# Patient Record
Sex: MALE | Race: WHITE | NOT HISPANIC OR LATINO | Employment: UNEMPLOYED | URBAN - METROPOLITAN AREA
[De-identification: names, ages, dates, MRNs, and addresses within clinical notes are randomized per-mention and may not be internally consistent; named-entity substitution may affect disease eponyms.]

---

## 2017-01-20 ENCOUNTER — APPOINTMENT (OUTPATIENT)
Dept: CT IMAGING | Facility: HOSPITAL | Age: 27
End: 2017-01-20
Attending: NEUROLOGICAL SURGERY

## 2017-01-30 ENCOUNTER — APPOINTMENT (OUTPATIENT)
Dept: CT IMAGING | Facility: HOSPITAL | Age: 27
End: 2017-01-30
Attending: NEUROLOGICAL SURGERY

## 2017-01-30 ENCOUNTER — APPOINTMENT (OUTPATIENT)
Dept: CT IMAGING | Facility: HOSPITAL | Age: 27
End: 2017-01-30

## 2017-02-07 ENCOUNTER — OFFICE VISIT (OUTPATIENT)
Dept: PSYCHIATRY | Facility: CLINIC | Age: 27
End: 2017-02-07

## 2017-02-07 VITALS
BODY MASS INDEX: 28.29 KG/M2 | HEART RATE: 86 BPM | SYSTOLIC BLOOD PRESSURE: 126 MMHG | WEIGHT: 191 LBS | HEIGHT: 69 IN | DIASTOLIC BLOOD PRESSURE: 77 MMHG

## 2017-02-07 DIAGNOSIS — F25.0 SCHIZOAFFECTIVE DISORDER, BIPOLAR TYPE (HCC): Primary | ICD-10-CM

## 2017-02-07 PROCEDURE — 99214 OFFICE O/P EST MOD 30 MIN: CPT | Performed by: NURSE PRACTITIONER

## 2017-02-07 RX ORDER — RISPERIDONE 0.5 MG/1
0.5 TABLET ORAL 2 TIMES DAILY
Qty: 60 TABLET | Refills: 0 | Status: SHIPPED | OUTPATIENT
Start: 2017-02-07 | End: 2017-04-24 | Stop reason: SDUPTHER

## 2017-02-07 RX ORDER — GUANFACINE 1 MG/1
0.5 TABLET ORAL 2 TIMES DAILY
Qty: 30 TABLET | Refills: 0 | Status: SHIPPED | OUTPATIENT
Start: 2017-02-07 | End: 2017-04-24 | Stop reason: SDUPTHER

## 2017-02-07 RX ORDER — DIVALPROEX SODIUM 500 MG/1
500 TABLET, DELAYED RELEASE ORAL NIGHTLY
Qty: 90 TABLET | Refills: 2 | Status: SHIPPED | OUTPATIENT
Start: 2017-02-07 | End: 2017-04-24 | Stop reason: SDUPTHER

## 2017-02-07 RX ORDER — PALIPERIDONE 3 MG/1
3 TABLET, EXTENDED RELEASE ORAL EVERY MORNING
Qty: 30 TABLET | Refills: 0 | Status: SHIPPED | OUTPATIENT
Start: 2017-02-07 | End: 2017-02-07

## 2017-02-07 NOTE — PROGRESS NOTES
Subjective   [place] Jin Brewster is a 26 y.o. male who is here today for medication management follow up at the [facility], he presents to his appointment about 10 minutes late.     Chief Complaint:  Follow-up    History of Present Illness   He states that he is not having any positive effects from current medications- he is no longer taking the medications that were prescribed.   He states that his mood and depression seems to be triggered by little things, he has a lot of anxiety.  He is planning on starting the suboxone clinic tomorrow, shares that he continues to use pills off the street.  He states that he is no longer living with his parents, homeless currently.  He did get a new car and started a relationship.  He feels like his anxiety gets so bad that he can't spend time with his son, he doesn't have the energy to interact.  He has been having issues with more anger lately.  He states that his sleep has been disrupted.  Denies any health issues, needing another CT scan on his back.  He has been having hallucinations, seeing things at night before bed.  Denies any SI/HI.      The following portions of the patient's history were reviewed and updated as appropriate: allergies, current medications, past family history, past medical history, past social history, past surgical history and problem list.    Review of Systems   Constitutional: Negative for appetite change, chills, diaphoresis, fatigue, fever and unexpected weight change.   HENT: Negative for hearing loss, sore throat, trouble swallowing and voice change.    Eyes: Negative for photophobia and visual disturbance.   Respiratory: Negative for cough, chest tightness and shortness of breath.    Cardiovascular: Negative for chest pain and palpitations.   Gastrointestinal: Negative for abdominal pain, constipation, nausea and vomiting.   Endocrine: Negative for cold intolerance and heat intolerance.   Genitourinary: Negative for dysuria and frequency.  "  Musculoskeletal: Positive for back pain. Negative for arthralgias, joint swelling and neck stiffness.   Skin: Negative for color change and wound.   Allergic/Immunologic: Negative for environmental allergies and immunocompromised state.   Neurological: Negative for dizziness, tremors, seizures, syncope, weakness, light-headedness and headaches.   Hematological: Negative for adenopathy. Does not bruise/bleed easily.       Objective   Physical Exam   Constitutional: He appears well-developed and well-nourished. No distress.   Neurological: He is alert. Coordination and gait normal.   Vitals reviewed.    Blood pressure 126/77, pulse 86, height 69\" (175.3 cm), weight 191 lb (86.6 kg).    No Known Allergies    Current Medications:   Current Outpatient Prescriptions   Medication Sig Dispense Refill   • gabapentin (NEURONTIN) 800 MG tablet Take 1 tablet by mouth 4 (Four) Times a Day. 120 tablet 3   • divalproex (DEPAKOTE) 500 MG DR tablet Take 1 tablet by mouth Every Night. 90 tablet 2   • guanFACINE (TENEX) 1 MG tablet Take 0.5 tablets by mouth 2 (Two) Times a Day. 30 tablet 0   • paliperidone (INVEGA) 3 MG 24 hr tablet Take 1 tablet by mouth Every Morning. 30 tablet 0     No current facility-administered medications for this visit.        Mental Status Exam:   Hygiene:   fair  Cooperation:  Cooperative  Eye Contact:  Fair  Psychomotor Behavior:  Appropriate  Affect:  Appropriate  Hopelessness: 4  Speech:  Normal  Thought Process:  Goal directed and Linear  Thought Content:  Normal  Suicidal:  None  Homicidal:  None  Hallucinations:  None  Delusion:  None  Memory:  Intact  Orientation:  Person, Place, Time and Situation  Reliability:  fair  Insight:  Fair  Judgement:  Fair  Impulse Control:  Fair  Physical/Medical Issues:  Yes back issues    Assessment/Plan     Long term use of drug  -     [facility] Drug Screen    Schizoaffective disorder, Bipolar Type    Post traumatic stress disorder (PTSD)    Other orders    Depakote " 500mg qhs  tenex 1mg 1/2 tab bid  invega 3mg daily          Discused medications options.  Discontinue propranolol, abilify, and effexor- stopped taking on his own.  Begin depakote for mood, tenex for anxiety and invega for hallucinations.  Reviewed  [employer] testing.  Jury Duty form completed.  Discussed the risks, beneefits, and side effects of the medications; patient ackowledged and verbally consentedd.  Patient is aware to call the [facility] with any worsening of symptoms.  Patient is agreeable to call 911 or go to the nearest ER should he/she begin having SI/HI.    Return in 4 weeks       -- insurance will not pay for invega.  Will switch to risperdal 0.5mg bid

## 2017-02-15 ENCOUNTER — APPOINTMENT (OUTPATIENT)
Dept: CT IMAGING | Facility: HOSPITAL | Age: 27
End: 2017-02-15

## 2017-02-15 ENCOUNTER — APPOINTMENT (OUTPATIENT)
Dept: CT IMAGING | Facility: HOSPITAL | Age: 27
End: 2017-02-15
Attending: NEUROLOGICAL SURGERY

## 2017-02-24 ENCOUNTER — APPOINTMENT (OUTPATIENT)
Dept: CT IMAGING | Facility: HOSPITAL | Age: 27
End: 2017-02-24
Attending: NEUROLOGICAL SURGERY

## 2017-02-24 ENCOUNTER — APPOINTMENT (OUTPATIENT)
Dept: CT IMAGING | Facility: HOSPITAL | Age: 27
End: 2017-02-24

## 2017-03-07 ENCOUNTER — OFFICE VISIT (OUTPATIENT)
Dept: PSYCHIATRY | Facility: CLINIC | Age: 27
End: 2017-03-07

## 2017-03-07 VITALS
HEIGHT: 69 IN | BODY MASS INDEX: 30.51 KG/M2 | SYSTOLIC BLOOD PRESSURE: 129 MMHG | HEART RATE: 93 BPM | WEIGHT: 206 LBS | DIASTOLIC BLOOD PRESSURE: 84 MMHG

## 2017-03-07 DIAGNOSIS — F43.10 POST TRAUMATIC STRESS DISORDER (PTSD): ICD-10-CM

## 2017-03-07 DIAGNOSIS — F25.0 SCHIZOAFFECTIVE DISORDER, BIPOLAR TYPE (HCC): Primary | ICD-10-CM

## 2017-03-07 PROCEDURE — 99213 OFFICE O/P EST LOW 20 MIN: CPT | Performed by: NURSE PRACTITIONER

## 2017-03-07 NOTE — PROGRESS NOTES
Subjective   [place] Jin Brewster is a 26 y.o. male who is here today for medication management follow up at the [facility], he presents to his appointment about 30 minutes early    Chief Complaint:  Follow-up    History of Present Illness   Patient presented to his appointment with his aunt whom he calls Mom.  He is noted to be over-sedating with dificulty walking to the office.  He sits in the seat but is not able to respond to questions, after several attempts per his mother and this provider he is alert enough to state that he has had these episodes a couple of time and doesn't know what causes them.  He shares that he feels overly tired, reports that it started on his way over to his appointment.  He shares that he took his gabapentin before he came to his appointment.  He feels like it is seizure activity but is noted to be on 2 different medications for treatment.  Recommended that he go to the ER for evaluation but patient adamantly reports that it is because he is tired and refuses to go to ER.  He denies any symptoms of SI/HI just noted to be over-sedated.  He is agreeable to return next week for medication management because he doesn't feel like the medications are helpful in treating his schizophrenia.  Will hold RX until next appointment.  Again, recommended ER but patient refused.  Informed mother to bring him is he has any worsening of symptoms, patient and mother is agreeable.       The following portions of the patient's history were reviewed and updated as appropriate: allergies, current medications, past family history, past medical history, past social history, past surgical history and problem list.    Review of Systems   Constitutional: Positive for fatigue. Negative for appetite change, chills, diaphoresis, fever and unexpected weight change.   HENT: Negative for hearing loss, sore throat, trouble swallowing and voice change.    Eyes: Negative for photophobia and visual disturbance.  "  Respiratory: Negative for cough, chest tightness and shortness of breath.    Cardiovascular: Negative for chest pain and palpitations.   Gastrointestinal: Negative for abdominal pain, constipation, nausea and vomiting.   Endocrine: Negative for cold intolerance and heat intolerance.   Genitourinary: Negative for dysuria and frequency.   Musculoskeletal: Positive for back pain. Negative for arthralgias, joint swelling and neck stiffness.   Skin: Negative for color change and wound.   Allergic/Immunologic: Negative for environmental allergies and immunocompromised state.   Neurological: Negative for dizziness, tremors, seizures, syncope, weakness, light-headedness and headaches.   Hematological: Negative for adenopathy. Does not bruise/bleed easily.   Psychiatric/Behavioral:        Difficulty responding to questions, required multiple attempts.        Objective   Physical Exam   Constitutional: He appears well-developed. No distress.   Neurological: Coordination abnormal. Gait normal.   Required assistance for mobilization   Vitals reviewed.    Blood pressure 129/84, pulse 93, height 69\" (175.3 cm), weight 206 lb (93.4 kg).    No Known Allergies    Current Medications:   Current Outpatient Prescriptions   Medication Sig Dispense Refill   • divalproex (DEPAKOTE) 500 MG DR tablet Take 1 tablet by mouth Every Night. 90 tablet 2   • gabapentin (NEURONTIN) 800 MG tablet Take 1 tablet by mouth 4 (Four) Times a Day. 120 tablet 3   • guanFACINE (TENEX) 1 MG tablet Take 0.5 tablets by mouth 2 (Two) Times a Day. 30 tablet 0   • risperiDONE (RISPERDAL) 0.5 MG tablet Take 1 tablet by mouth 2 (Two) Times a Day. 60 tablet 0     No current facility-administered medications for this visit.        Mental Status Exam:   Hygiene:   fair  Cooperation:  uncoooperative  Eye Contact:  Closed  Psychomotor Behavior:  Slow  Affect:  Blunted  Hopelessness: Denies  Speech:  slurred  Thought Process:  Unable to demonstrate  Thought Content:  " limited  Suicidal:  None  Homicidal:  None  Hallucinations:  None  Delusion:  None  Memory:  Intact  Orientation:  Person, Place, Time and Situation  Reliability:  fair  Insight:  Fair  Judgement:  Fair  Impulse Control:  Fair  Physical/Medical Issues:  Yes back issues    Assessment/Plan     Long term use of drug  -     [facility] Drug Screen    Schizoaffective disorder, Bipolar Type    Post traumatic stress disorder (PTSD)    Other orders    Hold current medications.           Discused medications options.  Hold depakote for mood, tenex for anxiety and risperdal  for hallucinations.  Reviewed  [employer] testing.   Discussed the risks, beneefits, and side effects of the medications; patient ackowledged and verbally consentedd.  Patient is aware to call the [facility] with any worsening of symptoms.  Patient is agreeable to call 911 or go to the nearest ER should he/she begin having SI/HI.    Return in 1 weeks

## 2017-03-20 DIAGNOSIS — M40.295 OTHER KYPHOSIS OF THORACOLUMBAR REGION: ICD-10-CM

## 2017-03-20 DIAGNOSIS — G57.10 MERALGIA PARESTHETICA, UNSPECIFIED LATERALITY: ICD-10-CM

## 2017-03-20 RX ORDER — GABAPENTIN 800 MG/1
800 TABLET ORAL 4 TIMES DAILY
Qty: 120 TABLET | Refills: 0 | Status: SHIPPED | OUTPATIENT
Start: 2017-03-20 | End: 2017-04-24 | Stop reason: SDUPTHER

## 2017-03-20 NOTE — TELEPHONE ENCOUNTER
Provider:  Anjel  Caller:   [place]  Time of call:     Phone #:  [phone]  Surgery:  n/a  Surgery Date:  n/a  Last visit:   12/7/16    ANATOLIY:         Reason for call:       Gabapentin refill

## 2017-03-20 NOTE — TELEPHONE ENCOUNTER
We will refill one time. Pt has not been seen since December and has no showed several times. No further refills until he is seen

## 2017-04-24 DIAGNOSIS — M40.295 OTHER KYPHOSIS OF THORACOLUMBAR REGION: ICD-10-CM

## 2017-04-24 DIAGNOSIS — G57.10 MERALGIA PARESTHETICA, UNSPECIFIED LATERALITY: ICD-10-CM

## 2017-04-24 RX ORDER — DIVALPROEX SODIUM 500 MG/1
500 TABLET, DELAYED RELEASE ORAL NIGHTLY
Qty: 90 TABLET | Refills: 2 | Status: SHIPPED | OUTPATIENT
Start: 2017-04-24 | End: 2018-04-24

## 2017-04-24 RX ORDER — RISPERIDONE 0.5 MG/1
0.5 TABLET ORAL 2 TIMES DAILY
Qty: 60 TABLET | Refills: 0 | Status: SHIPPED | OUTPATIENT
Start: 2017-04-24

## 2017-04-24 RX ORDER — GABAPENTIN 800 MG/1
800 TABLET ORAL 4 TIMES DAILY
Qty: 120 TABLET | Refills: 0 | Status: SHIPPED | OUTPATIENT
Start: 2017-04-24 | End: 2017-05-18 | Stop reason: SDUPTHER

## 2017-04-24 RX ORDER — GUANFACINE 1 MG/1
0.5 TABLET ORAL 2 TIMES DAILY
Qty: 30 TABLET | Refills: 0 | Status: SHIPPED | OUTPATIENT
Start: 2017-04-24

## 2017-04-24 NOTE — TELEPHONE ENCOUNTER
Provider: Anjel  Caller: [place]  Time of call: 340  Phone #: [phone]  Surgery: n/a  Surgery Date: n/a  Last visit: 12/7/16  Next visit: sabina COPE:         Reason for call:         Refill request, pharmacy verified

## 2017-05-18 DIAGNOSIS — G57.10 MERALGIA PARESTHETICA, UNSPECIFIED LATERALITY: ICD-10-CM

## 2017-05-18 DIAGNOSIS — M40.295 OTHER KYPHOSIS OF THORACOLUMBAR REGION: ICD-10-CM

## 2017-05-18 RX ORDER — GABAPENTIN 800 MG/1
800 TABLET ORAL 3 TIMES DAILY
Qty: 120 TABLET | Refills: 0 | Status: SHIPPED | OUTPATIENT
Start: 2017-05-18 | End: 2017-06-15 | Stop reason: SDUPTHER

## 2017-06-15 DIAGNOSIS — G57.10 MERALGIA PARESTHETICA, UNSPECIFIED LATERALITY: ICD-10-CM

## 2017-06-15 DIAGNOSIS — M40.295 OTHER KYPHOSIS OF THORACOLUMBAR REGION: ICD-10-CM

## 2017-06-15 NOTE — TELEPHONE ENCOUNTER
Provider: Anjel  Caller: [place]  Time of call: 335  Phone #: [phone]  Surgery: n/a  Surgery Date: n/a  Last visit: 12/7/16  Next visit: no     Reason for call:         Refill request. States that it was erroneously ordered as TID last time yet he takes it QID.

## 2017-06-16 RX ORDER — GABAPENTIN 800 MG/1
800 TABLET ORAL 4 TIMES DAILY
Qty: 120 TABLET | Refills: 0 | Status: SHIPPED | OUTPATIENT
Start: 2017-06-16

## 2017-06-16 NOTE — TELEPHONE ENCOUNTER
Rx Escribed to pharmacy-   Called pt he is aware of the correction in his med dose- and the RF sent to [facility] in [place], [place]     Encounter closed.

## 2017-07-11 DIAGNOSIS — G57.10 MERALGIA PARESTHETICA, UNSPECIFIED LATERALITY: ICD-10-CM

## 2017-07-11 DIAGNOSIS — M40.295 OTHER KYPHOSIS OF THORACOLUMBAR REGION: ICD-10-CM

## 2017-07-11 RX ORDER — GABAPENTIN 800 MG/1
800 TABLET ORAL 4 TIMES DAILY
Qty: 120 TABLET | Refills: 0 | OUTPATIENT
Start: 2017-07-11

## 2017-07-11 NOTE — TELEPHONE ENCOUNTER
Provider:  Anjel  Caller: Ginger (mother)  Time of call: 9:28AM  Phone #:  [phone]  Surgery:  n/a  Surgery Date:  n/a  Last visit:  12/07/16   Next visit: n/a    ANATOLIY:    Only thing showing on Anatoliy is Suboxone.      Reason for call:       Gabapentin refill.

## 2017-07-11 NOTE — TELEPHONE ENCOUNTER
Pt aware that he can not have refill on Gabapentin since he has not been seen, he would need to address that at his next apt.     Patient wants to be seen in [place].     Monserrat can you schedule this patient please?

## 2017-07-17 NOTE — TELEPHONE ENCOUNTER
Pt called in again today for the RF on Gabapentin- explained we can not RF till he he is seen.   Monserrat where are with getting this pt on efren/ Anjel in [facility]?

## 2017-07-20 ENCOUNTER — DOCUMENTATION (OUTPATIENT)
Dept: NEUROSURGERY | Facility: CLINIC | Age: 27
End: 2017-07-20

## 2017-07-20 NOTE — PROGRESS NOTES
Patient called and I believe he asked for a refill on his Gabapentin.  I have lost his calls 3 times due to his phone coming in and out.  Per the note in his chart in May, he has to be seen before Dr. Hobbs can refill his Gabapentin.  He has been told on several occasions.